# Patient Record
Sex: MALE | Employment: FULL TIME | URBAN - METROPOLITAN AREA
[De-identification: names, ages, dates, MRNs, and addresses within clinical notes are randomized per-mention and may not be internally consistent; named-entity substitution may affect disease eponyms.]

---

## 2017-01-18 ENCOUNTER — COMMUNICATION - HEALTHEAST (OUTPATIENT)
Dept: INTERNAL MEDICINE | Facility: CLINIC | Age: 47
End: 2017-01-18

## 2017-01-20 ENCOUNTER — AMBULATORY - HEALTHEAST (OUTPATIENT)
Dept: INTERNAL MEDICINE | Facility: CLINIC | Age: 47
End: 2017-01-20

## 2017-01-20 DIAGNOSIS — H61.20 IMPACTED CERUMEN: ICD-10-CM

## 2017-01-20 DIAGNOSIS — K21.9 GERD (GASTROESOPHAGEAL REFLUX DISEASE): ICD-10-CM

## 2017-04-10 ENCOUNTER — RECORDS - HEALTHEAST (OUTPATIENT)
Dept: ADMINISTRATIVE | Facility: OTHER | Age: 47
End: 2017-04-10

## 2017-04-11 ENCOUNTER — COMMUNICATION - HEALTHEAST (OUTPATIENT)
Dept: INTERNAL MEDICINE | Facility: CLINIC | Age: 47
End: 2017-04-11

## 2017-04-13 ENCOUNTER — RECORDS - HEALTHEAST (OUTPATIENT)
Dept: ADMINISTRATIVE | Facility: OTHER | Age: 47
End: 2017-04-13

## 2017-04-14 ENCOUNTER — RECORDS - HEALTHEAST (OUTPATIENT)
Dept: ADMINISTRATIVE | Facility: OTHER | Age: 47
End: 2017-04-14

## 2017-04-19 ENCOUNTER — RECORDS - HEALTHEAST (OUTPATIENT)
Dept: GENERAL RADIOLOGY | Facility: CLINIC | Age: 47
End: 2017-04-19

## 2017-04-19 ENCOUNTER — OFFICE VISIT - HEALTHEAST (OUTPATIENT)
Dept: INTERNAL MEDICINE | Facility: CLINIC | Age: 47
End: 2017-04-19

## 2017-04-19 DIAGNOSIS — S69.90XA FINGER INJURY: ICD-10-CM

## 2017-04-19 DIAGNOSIS — S69.90XA UNSPECIFIED INJURY OF UNSPECIFIED WRIST, HAND AND FINGER(S), INITIAL ENCOUNTER: ICD-10-CM

## 2017-04-19 ASSESSMENT — MIFFLIN-ST. JEOR: SCORE: 1484.08

## 2017-05-18 ENCOUNTER — AMBULATORY - HEALTHEAST (OUTPATIENT)
Dept: INTERNAL MEDICINE | Facility: CLINIC | Age: 47
End: 2017-05-18

## 2017-05-18 ENCOUNTER — COMMUNICATION - HEALTHEAST (OUTPATIENT)
Dept: INTERNAL MEDICINE | Facility: CLINIC | Age: 47
End: 2017-05-18

## 2017-05-18 DIAGNOSIS — K21.9 GASTROESOPHAGEAL REFLUX DISEASE WITHOUT ESOPHAGITIS: ICD-10-CM

## 2017-05-19 ENCOUNTER — RECORDS - HEALTHEAST (OUTPATIENT)
Dept: ADMINISTRATIVE | Facility: OTHER | Age: 47
End: 2017-05-19

## 2017-05-31 ENCOUNTER — RECORDS - HEALTHEAST (OUTPATIENT)
Dept: ADMINISTRATIVE | Facility: OTHER | Age: 47
End: 2017-05-31

## 2017-10-11 ENCOUNTER — COMMUNICATION - HEALTHEAST (OUTPATIENT)
Dept: INTERNAL MEDICINE | Facility: CLINIC | Age: 47
End: 2017-10-11

## 2017-10-12 ENCOUNTER — COMMUNICATION - HEALTHEAST (OUTPATIENT)
Dept: INTERNAL MEDICINE | Facility: CLINIC | Age: 47
End: 2017-10-12

## 2017-10-12 DIAGNOSIS — K21.9 GASTROESOPHAGEAL REFLUX DISEASE WITHOUT ESOPHAGITIS: ICD-10-CM

## 2017-10-13 ENCOUNTER — COMMUNICATION - HEALTHEAST (OUTPATIENT)
Dept: INTERNAL MEDICINE | Facility: CLINIC | Age: 47
End: 2017-10-13

## 2017-10-13 DIAGNOSIS — K21.9 GASTROESOPHAGEAL REFLUX DISEASE WITHOUT ESOPHAGITIS: ICD-10-CM

## 2017-10-17 ENCOUNTER — RECORDS - HEALTHEAST (OUTPATIENT)
Dept: ADMINISTRATIVE | Facility: OTHER | Age: 47
End: 2017-10-17

## 2017-10-17 ENCOUNTER — COMMUNICATION - HEALTHEAST (OUTPATIENT)
Dept: INTERNAL MEDICINE | Facility: CLINIC | Age: 47
End: 2017-10-17

## 2017-10-25 ENCOUNTER — COMMUNICATION - HEALTHEAST (OUTPATIENT)
Dept: INTERNAL MEDICINE | Facility: CLINIC | Age: 47
End: 2017-10-25

## 2017-11-15 ENCOUNTER — RECORDS - HEALTHEAST (OUTPATIENT)
Dept: ADMINISTRATIVE | Facility: OTHER | Age: 47
End: 2017-11-15

## 2017-11-17 ENCOUNTER — OFFICE VISIT - HEALTHEAST (OUTPATIENT)
Dept: INTERNAL MEDICINE | Facility: CLINIC | Age: 47
End: 2017-11-17

## 2017-11-17 DIAGNOSIS — K58.9 IRRITABLE BOWEL SYNDROME: ICD-10-CM

## 2017-11-17 DIAGNOSIS — K21.9 GERD (GASTROESOPHAGEAL REFLUX DISEASE): ICD-10-CM

## 2017-11-17 DIAGNOSIS — E55.9 VITAMIN D DEFICIENCY: ICD-10-CM

## 2017-11-17 DIAGNOSIS — D64.9 ANEMIA: ICD-10-CM

## 2017-11-17 DIAGNOSIS — Z00.00 ROUTINE GENERAL MEDICAL EXAMINATION AT A HEALTH CARE FACILITY: ICD-10-CM

## 2017-11-17 LAB
CHOLEST SERPL-MCNC: 166 MG/DL
FASTING STATUS PATIENT QL REPORTED: YES
HDLC SERPL-MCNC: 42 MG/DL
LDLC SERPL CALC-MCNC: 110 MG/DL
TRIGL SERPL-MCNC: 70 MG/DL

## 2017-11-17 ASSESSMENT — MIFFLIN-ST. JEOR: SCORE: 1488.61

## 2018-04-12 ENCOUNTER — COMMUNICATION - HEALTHEAST (OUTPATIENT)
Dept: INTERNAL MEDICINE | Facility: CLINIC | Age: 48
End: 2018-04-12

## 2018-04-12 DIAGNOSIS — K21.9 GASTROESOPHAGEAL REFLUX DISEASE WITHOUT ESOPHAGITIS: ICD-10-CM

## 2018-08-28 ENCOUNTER — AMBULATORY - HEALTHEAST (OUTPATIENT)
Dept: INTERNAL MEDICINE | Facility: CLINIC | Age: 48
End: 2018-08-28

## 2018-08-28 ENCOUNTER — COMMUNICATION - HEALTHEAST (OUTPATIENT)
Dept: INTERNAL MEDICINE | Facility: CLINIC | Age: 48
End: 2018-08-28

## 2018-11-19 ENCOUNTER — OFFICE VISIT - HEALTHEAST (OUTPATIENT)
Dept: INTERNAL MEDICINE | Facility: CLINIC | Age: 48
End: 2018-11-19

## 2018-11-19 DIAGNOSIS — E55.9 VITAMIN D DEFICIENCY: ICD-10-CM

## 2018-11-19 DIAGNOSIS — Z00.00 ROUTINE GENERAL MEDICAL EXAMINATION AT A HEALTH CARE FACILITY: ICD-10-CM

## 2018-11-19 DIAGNOSIS — K21.9 GASTROESOPHAGEAL REFLUX DISEASE WITHOUT ESOPHAGITIS: ICD-10-CM

## 2018-11-19 DIAGNOSIS — K58.9 IRRITABLE BOWEL SYNDROME, UNSPECIFIED TYPE: ICD-10-CM

## 2018-11-19 ASSESSMENT — MIFFLIN-ST. JEOR: SCORE: 1484.08

## 2018-11-21 ENCOUNTER — RECORDS - HEALTHEAST (OUTPATIENT)
Dept: ADMINISTRATIVE | Facility: OTHER | Age: 48
End: 2018-11-21

## 2018-11-21 ENCOUNTER — COMMUNICATION - HEALTHEAST (OUTPATIENT)
Dept: INTERNAL MEDICINE | Facility: CLINIC | Age: 48
End: 2018-11-21

## 2019-07-30 ENCOUNTER — COMMUNICATION - HEALTHEAST (OUTPATIENT)
Dept: INTERNAL MEDICINE | Facility: CLINIC | Age: 49
End: 2019-07-30

## 2019-07-30 DIAGNOSIS — K21.9 GASTROESOPHAGEAL REFLUX DISEASE WITHOUT ESOPHAGITIS: ICD-10-CM

## 2019-07-31 ENCOUNTER — COMMUNICATION - HEALTHEAST (OUTPATIENT)
Dept: INTERNAL MEDICINE | Facility: CLINIC | Age: 49
End: 2019-07-31

## 2019-07-31 DIAGNOSIS — K21.9 GASTROESOPHAGEAL REFLUX DISEASE WITHOUT ESOPHAGITIS: ICD-10-CM

## 2019-08-16 ENCOUNTER — AMBULATORY - HEALTHEAST (OUTPATIENT)
Dept: INTERNAL MEDICINE | Facility: CLINIC | Age: 49
End: 2019-08-16

## 2019-08-16 ENCOUNTER — COMMUNICATION - HEALTHEAST (OUTPATIENT)
Dept: INTERNAL MEDICINE | Facility: CLINIC | Age: 49
End: 2019-08-16

## 2019-08-16 ENCOUNTER — OFFICE VISIT - HEALTHEAST (OUTPATIENT)
Dept: INTERNAL MEDICINE | Facility: CLINIC | Age: 49
End: 2019-08-16

## 2019-08-16 DIAGNOSIS — K64.9 HEMORRHOIDS, UNSPECIFIED HEMORRHOID TYPE: ICD-10-CM

## 2019-08-16 DIAGNOSIS — K58.9 IRRITABLE BOWEL SYNDROME, UNSPECIFIED TYPE: ICD-10-CM

## 2019-08-16 DIAGNOSIS — L29.0 ANAL PRURITUS: ICD-10-CM

## 2019-08-16 ASSESSMENT — MIFFLIN-ST. JEOR: SCORE: 1465.93

## 2019-08-21 ENCOUNTER — COMMUNICATION - HEALTHEAST (OUTPATIENT)
Dept: INTERNAL MEDICINE | Facility: CLINIC | Age: 49
End: 2019-08-21

## 2019-11-08 ENCOUNTER — HEALTH MAINTENANCE LETTER (OUTPATIENT)
Age: 49
End: 2019-11-08

## 2019-11-22 ENCOUNTER — OFFICE VISIT - HEALTHEAST (OUTPATIENT)
Dept: INTERNAL MEDICINE | Facility: CLINIC | Age: 49
End: 2019-11-22

## 2019-11-22 DIAGNOSIS — K58.9 IRRITABLE BOWEL SYNDROME, UNSPECIFIED TYPE: ICD-10-CM

## 2019-11-22 DIAGNOSIS — Z13.220 LIPID SCREENING: ICD-10-CM

## 2019-11-22 DIAGNOSIS — E55.9 VITAMIN D DEFICIENCY: ICD-10-CM

## 2019-11-22 DIAGNOSIS — Z00.00 ROUTINE GENERAL MEDICAL EXAMINATION AT A HEALTH CARE FACILITY: ICD-10-CM

## 2019-11-22 DIAGNOSIS — Z11.4 SCREENING FOR HIV (HUMAN IMMUNODEFICIENCY VIRUS): ICD-10-CM

## 2019-11-22 DIAGNOSIS — K21.9 GASTROESOPHAGEAL REFLUX DISEASE WITHOUT ESOPHAGITIS: ICD-10-CM

## 2019-11-22 DIAGNOSIS — L30.9 PERIANAL DERMATITIS: ICD-10-CM

## 2019-11-22 LAB
ALBUMIN SERPL-MCNC: 4.1 G/DL (ref 3.5–5)
ALBUMIN UR-MCNC: NEGATIVE MG/DL
ALP SERPL-CCNC: 83 U/L (ref 45–120)
ALT SERPL W P-5'-P-CCNC: 13 U/L (ref 0–45)
ANION GAP SERPL CALCULATED.3IONS-SCNC: 11 MMOL/L (ref 5–18)
APPEARANCE UR: CLEAR
AST SERPL W P-5'-P-CCNC: 13 U/L (ref 0–40)
BILIRUB SERPL-MCNC: 1 MG/DL (ref 0–1)
BILIRUB UR QL STRIP: NEGATIVE
BUN SERPL-MCNC: 19 MG/DL (ref 8–22)
CALCIUM SERPL-MCNC: 9.7 MG/DL (ref 8.5–10.5)
CHLORIDE BLD-SCNC: 102 MMOL/L (ref 98–107)
CHOLEST SERPL-MCNC: 170 MG/DL
CO2 SERPL-SCNC: 27 MMOL/L (ref 22–31)
COLOR UR AUTO: YELLOW
CREAT SERPL-MCNC: 1.04 MG/DL (ref 0.7–1.3)
ERYTHROCYTE [DISTWIDTH] IN BLOOD BY AUTOMATED COUNT: 12.5 % (ref 11–14.5)
FASTING STATUS PATIENT QL REPORTED: YES
GFR SERPL CREATININE-BSD FRML MDRD: >60 ML/MIN/1.73M2
GLUCOSE BLD-MCNC: 98 MG/DL (ref 70–125)
GLUCOSE UR STRIP-MCNC: NEGATIVE MG/DL
HCT VFR BLD AUTO: 41.6 % (ref 40–54)
HDLC SERPL-MCNC: 49 MG/DL
HGB BLD-MCNC: 13.9 G/DL (ref 14–18)
HGB UR QL STRIP: NEGATIVE
HIV 1+2 AB+HIV1 P24 AG SERPL QL IA: NEGATIVE
KETONES UR STRIP-MCNC: NEGATIVE MG/DL
LDLC SERPL CALC-MCNC: 108 MG/DL
LEUKOCYTE ESTERASE UR QL STRIP: NEGATIVE
MCH RBC QN AUTO: 29.9 PG (ref 27–34)
MCHC RBC AUTO-ENTMCNC: 33.3 G/DL (ref 32–36)
MCV RBC AUTO: 90 FL (ref 80–100)
NITRATE UR QL: NEGATIVE
PH UR STRIP: 6 [PH] (ref 5–8)
PLATELET # BLD AUTO: 188 THOU/UL (ref 140–440)
PMV BLD AUTO: 8.6 FL (ref 7–10)
POTASSIUM BLD-SCNC: 4.3 MMOL/L (ref 3.5–5)
PROT SERPL-MCNC: 7.5 G/DL (ref 6–8)
RBC # BLD AUTO: 4.65 MILL/UL (ref 4.4–6.2)
SODIUM SERPL-SCNC: 140 MMOL/L (ref 136–145)
SP GR UR STRIP: 1.01 (ref 1–1.03)
TRIGL SERPL-MCNC: 63 MG/DL
UROBILINOGEN UR STRIP-ACNC: NORMAL
WBC: 7.4 THOU/UL (ref 4–11)

## 2019-11-22 ASSESSMENT — MIFFLIN-ST. JEOR: SCORE: 1493.15

## 2019-11-23 ENCOUNTER — COMMUNICATION - HEALTHEAST (OUTPATIENT)
Dept: INTERNAL MEDICINE | Facility: CLINIC | Age: 49
End: 2019-11-23

## 2019-11-25 LAB
25(OH)D3 SERPL-MCNC: 26.7 NG/ML (ref 30–80)
25(OH)D3 SERPL-MCNC: 26.7 NG/ML (ref 30–80)

## 2020-07-07 ENCOUNTER — RECORDS - HEALTHEAST (OUTPATIENT)
Dept: ADMINISTRATIVE | Facility: OTHER | Age: 50
End: 2020-07-07
Payer: COMMERCIAL

## 2020-10-22 ENCOUNTER — COMMUNICATION - HEALTHEAST (OUTPATIENT)
Dept: INTERNAL MEDICINE | Facility: CLINIC | Age: 50
End: 2020-10-22

## 2020-11-13 ENCOUNTER — OFFICE VISIT - HEALTHEAST (OUTPATIENT)
Dept: INTERNAL MEDICINE | Facility: CLINIC | Age: 50
End: 2020-11-13

## 2020-11-13 DIAGNOSIS — Z00.00 ROUTINE GENERAL MEDICAL EXAMINATION AT A HEALTH CARE FACILITY: ICD-10-CM

## 2020-11-13 DIAGNOSIS — Z11.59 ENCOUNTER FOR HEPATITIS C SCREENING TEST FOR LOW RISK PATIENT: ICD-10-CM

## 2020-11-13 DIAGNOSIS — K21.9 GASTROESOPHAGEAL REFLUX DISEASE WITHOUT ESOPHAGITIS: ICD-10-CM

## 2020-11-13 DIAGNOSIS — E55.9 VITAMIN D DEFICIENCY: ICD-10-CM

## 2020-11-13 DIAGNOSIS — K59.89 VISCERAL HYPERSENSITIVITY SYNDROME: ICD-10-CM

## 2020-11-13 DIAGNOSIS — Z13.220 ENCOUNTER FOR SCREENING FOR LIPOID DISORDERS: ICD-10-CM

## 2020-11-13 LAB
ALBUMIN SERPL-MCNC: 4.4 G/DL (ref 3.5–5)
ALBUMIN UR-MCNC: NEGATIVE MG/DL
ALP SERPL-CCNC: 72 U/L (ref 45–120)
ALT SERPL W P-5'-P-CCNC: 15 U/L (ref 0–45)
ANION GAP SERPL CALCULATED.3IONS-SCNC: 8 MMOL/L (ref 5–18)
APPEARANCE UR: CLEAR
AST SERPL W P-5'-P-CCNC: 15 U/L (ref 0–40)
BILIRUB SERPL-MCNC: 1.6 MG/DL (ref 0–1)
BILIRUB UR QL STRIP: NEGATIVE
BUN SERPL-MCNC: 18 MG/DL (ref 8–22)
CALCIUM SERPL-MCNC: 9.3 MG/DL (ref 8.5–10.5)
CHLORIDE BLD-SCNC: 99 MMOL/L (ref 98–107)
CHOLEST SERPL-MCNC: 189 MG/DL
CO2 SERPL-SCNC: 31 MMOL/L (ref 22–31)
COLOR UR AUTO: YELLOW
CREAT SERPL-MCNC: 1 MG/DL (ref 0.7–1.3)
ERYTHROCYTE [DISTWIDTH] IN BLOOD BY AUTOMATED COUNT: 11.8 % (ref 11–14.5)
FASTING STATUS PATIENT QL REPORTED: YES
GFR SERPL CREATININE-BSD FRML MDRD: >60 ML/MIN/1.73M2
GLUCOSE BLD-MCNC: 88 MG/DL (ref 70–125)
GLUCOSE UR STRIP-MCNC: NEGATIVE MG/DL
HCT VFR BLD AUTO: 46.9 % (ref 40–54)
HDLC SERPL-MCNC: 56 MG/DL
HGB BLD-MCNC: 15.6 G/DL (ref 14–18)
HGB UR QL STRIP: NEGATIVE
KETONES UR STRIP-MCNC: NEGATIVE MG/DL
LDLC SERPL CALC-MCNC: 115 MG/DL
LEUKOCYTE ESTERASE UR QL STRIP: NEGATIVE
MCH RBC QN AUTO: 31.3 PG (ref 27–34)
MCHC RBC AUTO-ENTMCNC: 33.2 G/DL (ref 32–36)
MCV RBC AUTO: 94 FL (ref 80–100)
NITRATE UR QL: NEGATIVE
PH UR STRIP: 5.5 [PH] (ref 5–8)
PLATELET # BLD AUTO: 175 THOU/UL (ref 140–440)
PMV BLD AUTO: 8.5 FL (ref 7–10)
POTASSIUM BLD-SCNC: 4 MMOL/L (ref 3.5–5)
PROT SERPL-MCNC: 7.5 G/DL (ref 6–8)
RBC # BLD AUTO: 4.97 MILL/UL (ref 4.4–6.2)
SODIUM SERPL-SCNC: 138 MMOL/L (ref 136–145)
SP GR UR STRIP: 1.01 (ref 1–1.03)
TRIGL SERPL-MCNC: 88 MG/DL
UROBILINOGEN UR STRIP-ACNC: NORMAL
WBC: 5.4 THOU/UL (ref 4–11)

## 2020-11-13 ASSESSMENT — MIFFLIN-ST. JEOR: SCORE: 1479.54

## 2020-11-16 LAB
25(OH)D3 SERPL-MCNC: 39.4 NG/ML (ref 30–80)
25(OH)D3 SERPL-MCNC: 39.4 NG/ML (ref 30–80)
HCV AB SERPL QL IA: NEGATIVE

## 2020-12-06 ENCOUNTER — HEALTH MAINTENANCE LETTER (OUTPATIENT)
Age: 50
End: 2020-12-06

## 2021-05-29 ENCOUNTER — RECORDS - HEALTHEAST (OUTPATIENT)
Dept: ADMINISTRATIVE | Facility: CLINIC | Age: 51
End: 2021-05-29

## 2021-05-30 ENCOUNTER — RECORDS - HEALTHEAST (OUTPATIENT)
Dept: ADMINISTRATIVE | Facility: CLINIC | Age: 51
End: 2021-05-30

## 2021-05-30 VITALS — HEIGHT: 64 IN | WEIGHT: 155 LBS | BODY MASS INDEX: 26.46 KG/M2

## 2021-05-30 NOTE — TELEPHONE ENCOUNTER
Refill Approved    Rx renewed per Medication Renewal Policy. Medication was last renewed on Nov 2018..    Milena Ramirez, [facility] Triage/Med Refill 7/30/2019     Requested Prescriptions   Pending Prescriptions Disp Refills     esomeprazole (NEXIUM) 20 MG capsule 90 capsule 0       GI Medications Refill Protocol Passed - 7/30/2019 12:53 PM        Passed - PCP or prescribing provider visit in last 12 or next 3 months.     Last office visit with prescriber/PCP: Visit date not found OR same dept: Visit date not found OR same specialty: 4/19/2017 Brian Guzman MD  Last physical: 11/19/2018 Last MTM visit: Visit date not found   Next visit within 3 mo: Visit date not found  Next physical within 3 mo: Visit date not found  Prescriber OR PCP: Hector Yoder MD  Last diagnosis associated with med order: 1. Gastroesophageal reflux disease without esophagitis  - esomeprazole (NEXIUM) 20 MG capsule  Dispense: 90 capsule; Refill: 0    If protocol passes may refill for 12 months if within 3 months of last provider visit (or a total of 15 months).

## 2021-05-31 VITALS — WEIGHT: 156 LBS | BODY MASS INDEX: 26.63 KG/M2 | HEIGHT: 64 IN

## 2021-05-31 NOTE — TELEPHONE ENCOUNTER
"Disregard previous message.  I just saw the reason he wants 20 mg in the \"patient call\" bucket  "

## 2021-05-31 NOTE — TELEPHONE ENCOUNTER
Question following Office Visit  When did you see your provider: Today 8/16/19  What is your question: During visit discussed a prescription for the Anusol HC suppository .  Needs this prescription called into pharmacy, as soon as possible as it leaving town again as soon as medication is picked up.  Okay to leave a detailed message: Yes    Call to [facility], on [address] in [facility]

## 2021-05-31 NOTE — TELEPHONE ENCOUNTER
Fax received from [facility], they have started the Prior Authorization Process via Cover My Meds    CoverMyMeds Key: [id number]    Medication Name:   hydrocortisone 25 mg suppository 24 suppository 1 8/16/2019     Sig - Route: Insert 1 suppository (25 mg total) into the rectum 2 (two) times a day as needed for hemorrhoids. - Rectal    Sent to pharmacy as: hydrocortisone 25 mg suppository    E-Prescribing Status: Receipt confirmed by pharmacy (8/16/2019 12:12 PM CDT)          Insurance Plan: [employer]  PBM: [employer]  Patient ID: not provided on fax    Please complete the PA process

## 2021-05-31 NOTE — TELEPHONE ENCOUNTER
Left message to call back for: Leonela  Information to relay to patient:  Rx has been sent to pharmacy

## 2021-05-31 NOTE — TELEPHONE ENCOUNTER
I assume he is having problems with hemorrhoids?  I actually do not recall the conversation.  I did send the prescription for Anusol HC suppository to his pharmacy.  Please notify

## 2021-05-31 NOTE — PROGRESS NOTES
Office Visit - Follow Up   Leonela Watson   48 y.o. male    Date of Visit: 8/16/2019    Chief Complaint   Patient presents with     Rectal Pain     painful bowel movements, itching and spotting x 1.5 weeks        Assessment and Plan     Perianal skin eruption and pain with defecation.   Symptom duration 2 weeks, progressive, and has not responded to his attempts at local skin care.  I suspect this is intertrigo, and we will treat with a skin care regimen as follows  - Wash area with a mild soap  - Pat dry with clean towel  - Dry further with hair dryer set on no-heat (air only)  - Apply OTC Desenex 2% antifungal powder  - Do this twice a day (morning and evening)    Even though I did not observe enlarged hemorrhoids on today's exam, he could still be experiencing hemorrhoidal pain.  He can combine the above measures with a trial of Anusol-HC suppository (contains hydrocortisone). Use per package direction (use once a day).    Hard stools, in the context of irritable bowel syndrome  I also asked him to start docusate stool softener (available OTC), which will help reduce pain and irritation       History of Present Illness   This 48 y.o. old [facility] internal medicine patient comes for evaluation of perianal skin eruption and pain with defecation.    He has a history of irritable bowel syndrome and reports hard stools  Pain with defecation has been going on for about 2 weeks.  During the same timeframe, he is developed raw skin and irritation of the perianal skin.  The skin is raw, moist, and he reports a fetid odor.  He has seen a spot of blood on the toilet paper, but no large amounts    He has GERD, on Nexium.         Medications, Allergies, Social, and Problem List   Current Outpatient Medications   Medication Sig Dispense Refill     cholecalciferol, vitamin D3, 1,000 unit tablet Take 1,000 Units by mouth daily.       esomeprazole (NEXIUM) 20 MG capsule Take 1 capsule (20 mg total) by mouth daily  "before breakfast. 90 capsule 3     ranitidine (ZANTAC) 150 MG tablet        esomeprazole (NEXIUM) 40 MG capsule Take 1 capsule (40 mg total) by mouth daily before breakfast. 90 capsule 1     imipramine (TOFRANIL) 10 MG tablet Take 1 tablet (10 mg total) by mouth at bedtime. 30 tablet 11     multivitamin therapeutic (THERAGRAN) tablet Take 1 tablet by mouth daily.       No current facility-administered medications for this visit.      Allergies   Allergen Reactions     Amitriptyline      Weight gain     Nortriptyline      Fatigue     Penicillins Hives     Social History     Tobacco Use     Smoking status: Former Smoker     Smokeless tobacco: Former User     Quit date: 1/30/2004     Tobacco comment: quit 2004   Substance Use Topics     Alcohol use: No     Drug use: Not on file     Patient Active Problem List   Diagnosis     GERD (gastroesophageal reflux disease)     Kidney stone     Irritable bowel syndrome     Globus sensation     Genital herpes     Impacted cerumen     Anemia     Vitamin D deficiency        Reviewed, reconciled and updated       Physical Exam   General Appearance: Very pleasant, but a bit anxious    /76 (Patient Site: Right Arm, Patient Position: Sitting)   Pulse 73   Ht 5' 4\" (1.626 m)   Wt 151 lb (68.5 kg)   SpO2 98%   BMI 25.92 kg/m      Abdomen nontender  Perianal skin is notable for moist, raw, erythematous area extending about 6 cm  radius around the anal verge.  Fetid odor  Palpation of the anal canal suggests small internal hemorrhoids.  No blood seen on the glove after digital rectal exam     Additional Information        Michael Boucher MD    "

## 2021-05-31 NOTE — PATIENT INSTRUCTIONS - HE
Perianal skin eruption and pain with defecation.   Symptom duration 2 weeks, progressive, and has not responded to his attempts at local skin care.  I suspect this is intertrigo, and we will treat with a skin care regimen as follows  - Wash area with a mild soap  - Pat dry with clean towel  - Dry further with hair dryer set on no-heat (air only)  - Apply OTC Desenex 2% antifungal powder  - Do this twice a day (morning and evening)    Even though I did not observe enlarged hemorrhoids on today's exam, he could still be experiencing hemorrhoidal pain.  He can combine the above measures with a trial of Anusol-HC suppository (contains hydrocortisone). Use per package direction (use once a day).    Hard stools  I also asked him to start docusate stool softener (available OTC), which will help reduce pain and irritation

## 2021-05-31 NOTE — TELEPHONE ENCOUNTER
Peri Burton for refill requested below?  Refill has been set up for you to review.  Alondra MCNEAL, JOSR/CMT....................5:02 PM

## 2021-06-02 VITALS — BODY MASS INDEX: 26.46 KG/M2 | HEIGHT: 64 IN | WEIGHT: 155 LBS

## 2021-06-03 VITALS
BODY MASS INDEX: 26.8 KG/M2 | SYSTOLIC BLOOD PRESSURE: 112 MMHG | HEART RATE: 78 BPM | WEIGHT: 157 LBS | HEIGHT: 64 IN | OXYGEN SATURATION: 98 % | DIASTOLIC BLOOD PRESSURE: 70 MMHG

## 2021-06-03 VITALS — BODY MASS INDEX: 25.78 KG/M2 | WEIGHT: 151 LBS | HEIGHT: 64 IN

## 2021-06-04 VITALS
BODY MASS INDEX: 26.29 KG/M2 | WEIGHT: 154 LBS | OXYGEN SATURATION: 99 % | HEART RATE: 65 BPM | DIASTOLIC BLOOD PRESSURE: 72 MMHG | HEIGHT: 64 IN | SYSTOLIC BLOOD PRESSURE: 110 MMHG

## 2021-06-10 NOTE — PROGRESS NOTES
"OFFICE VISIT NOTE    Subjective:   Chief Complaint:  Hand Pain (slammed left pinkie finger into the door frame catching himself from falling last night. hurts really bad, able to bend it.)    86-year-old man who jammed his left finger-hand into a door jam last night when he slipped.  He has discomfort and points to the left fifth finger especially the DIP joint region.    Current Outpatient Prescriptions   Medication Sig     cholecalciferol, vitamin D3, 1,000 unit tablet Take 1,000 Units by mouth daily.     esomeprazole (NEXIUM) 40 MG capsule Take 1 capsule (40 mg total) by mouth every morning before breakfast.     multivitamin therapeutic (THERAGRAN) tablet Take 1 tablet by mouth daily.       Review of Systems:  A comprehensive review of systems is negative except for the comments above    Objective:    Pulse 67  Ht 5' 4\" (1.626 m)  Wt 155 lb (70.3 kg)  SpO2 98%  BMI 26.61 kg/m2  GENERAL: No acute distress.  There is minor swelling over the left fifth finger DIP joint.  Good flexion extension.  Circulation sensation all seem intact.  No obvious deformity.    Assessment & Plan   Leonela Watson is a 46 y.o. male.    Left hand-finger contusion.  X-rays done but does not show any evidence of fracture.  Will treat this with cold packs as needed use of ibuprofen.    Diagnoses and all orders for this visit:    Finger injury  -     XR Hand Left 3 or More VWS; Future; Expected date: 4/19/17        Brian Guzman MD  Transcription using voice recognition software, may contain typographical errors.    "

## 2021-06-14 NOTE — PROGRESS NOTES
Office Visit - Physical   Leonela Watson   47 y.o.  male    Date of visit: 11/17/2017  Physician: Hector Yoder MD     Assessment and Plan   1. Routine general medical examination at a health care facility  Immunizations are reviewed and everything is up-to-date.  Living will discussed.  Non-smoker.  Does not use alcohol.  He tries to walk or ride his bike for exercise.  Colonoscopy at age 52.  Prostate is exam completed.  Check PSA at age 52.  Eye exams every 2 years and seeing his dentist regularly.  Skin exam performed and recommending regular use of sunblock.   Will screen for diabetes with fasting glucose.  Checking fasting lipid profile.      - Urinalysis  - Comprehensive Metabolic Panel  - Lipid Cascade    2. GERD (gastroesophageal reflux disease)  Remains on Nexium 40 mg daily although occasionally will take 20 mg instead.  Has been unable to wean off.  He had EGD in April.  Still has intermittent chest pains.  Thought to be IBS as described below.    3. Vitamin D deficiency  Vitamin D was low last year.  He is taking 1000 units daily.  Recheck level.  Would increase to 2000 units if still below 30  - Vitamin D, Total (25-Hydroxy)    4. Irritable bowel syndrome  There is a functional component to his recurrent chest pain related to esophagus.  Tricyclic antidepressants have been tried in the past and were partially helpful but he could not tolerate because of side effects.  We discussed imipramine and will begin a low dose of 10 mg at bedtime with plans to titrate up to 25 or 50 mg depending on how well he tolerates.  Prescription sent to his pharmacy    5. Anemia  Is a history of anemia although hemoglobin was improved last year.  Iron studies looked normal.  Will recheck hemoglobin.  We will also check B12 level especially with long-term PPI use  - Hemoglobin  - Vitamin B12    Return in about 1 year (around 11/17/2018) for Annual physical.     Chief Complaint   Chief Complaint   Patient presents  with     Annual Exam        Patient Profile   Social History     Social History Narrative    [employer]    , 2 children                Past Medical History   Patient Active Problem List   Diagnosis     GERD (gastroesophageal reflux disease)     Kidney stone     Irritable bowel syndrome     Globus sensation     Genital herpes     Impacted cerumen     Anemia     Vitamin D deficiency       Past Surgical History  He has no past surgical history on file.     History of Present Illness   This 47 y.o. old gentleman is here for his annual physical and follow-up medical problems.  Ongoing problems with reflux.  Using Nexium 40 mg daily.  Will occasionally take only 20 mg but this is occurring less often.  He underwent EGD this April showing no esophagitis or other abnormalities.  Some polyps in his stomach.  Biopsies were negative.  Continues to watch his diet carefully.  He does not drink alcohol.  He quit smoking 14 years ago.  When he tries to decrease the Nexium, symptoms worsen.  He describes pains in his chest.  Previously had globus sensation although this is under good control.  He has been evaluated by gastroenterology.  It is felt that he has a functional bowel problem affecting his esophagus.  We have tried amitriptyline and nortriptyline.  These cause side effects including fatigue or weight gain.  They were partially helpful.      Review of Systems: A comprehensive review of systems was negative except as noted.  General: No chronic fatigue, unexpected weight loss or weight gain, fevers, chills, or night sweats  Eyes: No significant change in vision.  Seeing ophthalmologist regularly.  ENT: No ear or sinus infections.  No change in hearing.  No tinnitus.  Respiratory: No wheezing, dyspnea on exertion, or chronic cough  Cardiovascular: No chest pain, palpitations, dizziness, or syncope.  No peripheral edema.  GI: No abdominal pain, nausea, vomiting, constipation, or diarrhea  : No change in  "frequency or incontinence.  No hematuria.  Skin: No new rashes or lesions.  He sees dermatology  Neurologic: No headaches, seizures, dizziness, weakness, or numbness.  Musculoskeletal: No muscle or joint pain.  Lymphatic: No swollen lymph nodes  Psychiatric: No depression, anxiety, or sleep disorder.       Medications and Allergies   Current Outpatient Prescriptions   Medication Sig Dispense Refill     cholecalciferol, vitamin D3, 1,000 unit tablet Take 1,000 Units by mouth daily.       esomeprazole (NEXIUM) 20 MG capsule Take 1 capsule (20 mg) by mouth daily. 90 capsule 0     esomeprazole (NEXIUM) 40 MG capsule Take 1 capsule (40 mg total) by mouth every morning before breakfast. 90 capsule 3     multivitamin therapeutic (THERAGRAN) tablet Take 1 tablet by mouth daily.       imipramine (TOFRANIL) 10 MG tablet Take 1 tablet (10 mg total) by mouth at bedtime. 30 tablet 11     No current facility-administered medications for this visit.      Allergies   Allergen Reactions     Amitriptyline      Weight gain     Nortriptyline      Fatigue     Penicillins Hives        Family and Social History   No family history on file.     Social History   Substance Use Topics     Smoking status: Former Smoker     Smokeless tobacco: Former User     Quit date: 1/30/2004      Comment: quit 2004     Alcohol use No        Physical Exam   General Appearance:   Well-appearing middle-aged male    /70 (Patient Site: Left Arm, Patient Position: Sitting, Cuff Size: Adult Regular)  Pulse 64  Ht 5' 4\" (1.626 m)  Wt 156 lb (70.8 kg)  SpO2 99%  BMI 26.78 kg/m2    EYES: Eyelids, conjunctiva, and sclera were normal. Pupils were normal. Cornea, iris, and lens were normal bilaterally.  HEAD, EARS, NOSE, MOUTH, AND THROAT: Head and face were normal. Nose appearance was normal and there was no discharge. Oropharynx was normal.  NECK: Neck appearance was normal. There were no neck masses and the thyroid was not enlarged and no nodules are felt. "  No lymphadenopathy.  RESPIRATORY: Breathing pattern was normal and the chest moved symmetrically.  Percussion/auscultatory percussion was normal.  Lung sounds were normal and there were no rales or wheezes.  CARDIOVASCULAR: Heart rate and rhythm were normal.  S1 and S2 were normal and there were no extra sounds or murmurs. Peripheral pulses in arms and legs were normal.  Jugular venous pressure was normal.  There was no peripheral edema.  No carotid bruits.  GASTROINTESTINAL: The abdomen was normal in contour.  Bowel sounds were present.  Percussion detected no organ enlargement or tenderness.  Palpation detected no tenderness, mass, or enlarged organs.   GENITALIA: No penile or testicular lesions.  No inguinal hernia.  RECTAL/PROSTATE: No external lesions.  Sphincter tone normal.  No palpable rectal lesions.  Prostate normal size, smooth, nontender without palpable lesions.  MUSCULOSKELETAL: Skeletal configuration was normal and muscle mass was normal for age. Joint appearance was overall normal.  LYMPHATIC: There were no enlarged nodes.  SKIN/HAIR/NAILS: Skin color was normal.  There were no skin lesions.  Hair and nails were normal.  NEUROLOGIC: The patient was alert and oriented to person, place, time, and circumstance. Speech was normal. Cranial nerves were normal. Motor strength was normal for age. The patient was normally coordinated.  Sensation intact.  PSYCHIATRIC:  Mood and affect were normal and the patient had normal recent and remote memory. The patient's judgment and insight were normal.       Additional Information        Hector Yoder MD  Internal Medicine  Contact me at [phone]

## 2021-06-16 PROBLEM — K59.89 VISCERAL HYPERSENSITIVITY SYNDROME: Status: ACTIVE | Noted: 2020-11-13

## 2021-06-16 PROBLEM — L30.9 PERIANAL DERMATITIS: Status: ACTIVE | Noted: 2019-11-22

## 2021-06-26 NOTE — PROGRESS NOTES
Progress Notes by Hector Yoder MD at 11/19/2018  8:20 AM     Author: Hector Yoder MD Service: -- Author Type: Physician    Filed: 11/19/2018  9:03 AM Encounter Date: 11/19/2018 Status: Signed    : Hector Yoder MD (Physician)       MALE PREVENTATIVE EXAM    Assessment and Plan:       1. Routine general medical examination at a health care facility  Immunizations are reviewed and recommending getting his tetanus updated.  He will do this at [facility].  Living will has been discussed.  Non-smoker.  He does not use alcohol.  Regular exercise discussed.  Recommending colonoscopy with new screening guidelines.  Prostate exam is normal and I would begin checking PSA at age 52.   He sees his ophthalmologist regularly and gets glaucoma screening.  Skin exam performed and recommending regular use of sunblock.    Will screen for diabetes with fasting glucose.  Lipid profile excellent last year.      2. Gastroesophageal reflux disease without esophagitis  He tried tapering down to ranitidine but symptoms have returned and have been quite bothersome.  Back on Nexium 40 mg daily and I would recommend continuing long-term given severity of symptoms  - esomeprazole (NEXIUM) 40 MG capsule; Take 1 capsule (40 mg total) by mouth daily before breakfast.  Dispense: 90 capsule; Refill: 3    3. Irritable bowel syndrome, unspecified type  There is also a component of functional bowel contributing to his symptoms.  He could try imipramine again although it did cause him some headache and fatigue last year after 1 dose.  Did not tolerate other tricyclics.  Symptoms mostly under control with PPI    4. Vitamin D deficiency  Now taking daily vitamin D supplement        Next follow up:  Return in 1 year (on 11/19/2019) for Annual physical.    Immunization Review  Adult Imm Review: Recommending tetanus      I discussed the following with the patient:   Adult Healthy Living: Importance of  "regular exercise  Healthy nutrition        Subjective:   Chief Complaint: Leonela Watson is an 48 y.o. male here for a preventative health visit.     HPI: He attempted to wean off of Nexium and was on ranitidine but developed increasing chest pain this fall.  Now back on Nexium 40 mg daily and symptoms are not completely under control yet.  Diagnosed with pityriasis rosea this spring.    Healthy Habits  Are you taking a daily aspirin? No  Do you typically exercising at least 40 min, 3-4 times per week?  Yes  Do you usually eat at least 4 servings of fruit and vegetables a day, include whole grains and fiber and avoid regularly eating high fat foods? Yes  Have you had an eye exam in the past two years? Yes  Do you see a dentist twice per year? Yes  Do you have any concerns regarding sleep? No    Safety Screen  If you own firearms, are they secured in a locked gun cabinet or with trigger locks? The patient declines to answer  Do you feel you are safe where you are living?: Yes (11/19/2018  8:10 AM)  Do you feel you are safe in your relationship(s)?: Yes (11/19/2018  8:10 AM)      Review of Systems:  Please see above.  The rest of the review of systems are negative for all systems.     Cancer Screening     Patient has no health maintenance due at this time              History     Reviewed By Date/Time Sections Reviewed    Hector Yoder MD 11/19/2018  8:43 AM Medical, Surgical, Tobacco, Alcohol, Drug Use, Sexual Activity    Hector Yoder MD 11/19/2018  8:40 AM Family    Enriqueta Reyna CMA 11/19/2018  8:09 AM Tobacco, Alcohol, Drug Use, Sexual Activity            Objective:   Vital Signs:   Visit Vitals  /60 (Patient Site: Left Arm, Patient Position: Sitting, Cuff Size: Adult Regular)   Pulse 65   Ht 5' 4\" (1.626 m)   Wt 155 lb (70.3 kg)   SpO2 99%   BMI 26.61 kg/m           PHYSICAL EXAM  EYES: Eyelids, conjunctiva, and sclera were normal. Pupils were normal. Cornea, iris, and lens were normal " bilaterally.  HEAD, EARS, NOSE, MOUTH, AND THROAT: Head and face were normal. Nose appearance was normal and there was no discharge. Oropharynx was normal.  NECK: Neck appearance was normal. There were no neck masses and the thyroid was not enlarged and no nodules are felt.  No lymphadenopathy.  RESPIRATORY: Breathing pattern was normal and the chest moved symmetrically.  Percussion/auscultatory percussion was normal.  Lung sounds were normal and there were no rales or wheezes.  CARDIOVASCULAR: Heart rate and rhythm were normal.  S1 and S2 were normal and there were no extra sounds or murmurs. Peripheral pulses in arms and legs were normal.  Jugular venous pressure was normal.  There was no peripheral edema.  No carotid bruits.  GASTROINTESTINAL: The abdomen was normal in contour.  Bowel sounds were present.   Palpation detected no tenderness, mass, or enlarged organs.   RECTAL/PROSTATE: No external lesions.  Sphincter tone normal.  No palpable rectal lesions.  Prostate normal size, smooth, nontender without nodules  MUSCULOSKELETAL: Skeletal configuration was normal and muscle mass was normal for age. Joint appearance was overall normal.  LYMPHATIC: There were no enlarged nodes.  SKIN/HAIR/NAILS: Skin color was normal.  Hair and nails were normal.There were no skin lesions.  NEUROLOGIC: The patient was alert and oriented to person, place, time, and circumstance. Speech was normal. Cranial nerves were normal. Motor strength was normal for age. The patient was normally coordinated.  Sensation intact.  PSYCHIATRIC:  Mood and affect were normal and the patient had normal recent and remote memory. The patient's judgment and insight were normal.             Medication List           Accurate as of 11/19/18  9:02 AM. If you have any questions, ask your nurse or doctor.               CONTINUE taking these medications    cholecalciferol (vitamin D3) 1,000 unit tablet  INSTRUCTIONS:  Take 1,000 Units by mouth daily.        *  esomeprazole 20 MG capsule  Also known as:  NexIUM  INSTRUCTIONS:  Take 1 capsule (20 mg) by mouth daily.        * esomeprazole 40 MG capsule  Also known as:  NexIUM  INSTRUCTIONS:  Take 1 capsule (40 mg total) by mouth daily before breakfast.        imipramine 10 MG tablet  Also known as:  TOFRANIL  INSTRUCTIONS:  Take 1 tablet (10 mg total) by mouth at bedtime.        multivitamin therapeutic tablet  Also known as:  THERAGRAN  INSTRUCTIONS:  Take 1 tablet by mouth daily.            * This list has 2 medication(s) that are the same as other medications prescribed for you. Read the directions carefully, and ask your doctor or other care provider to review them with you.            STOP taking these medications    ranitidine 150 MG tablet  Also known as:  ZANTAC  Stopped by:  Hector Yoder MD           Where to Get Your Medications      These medications were sent to [facility] - [address] [address]    Phone:  [phone]     esomeprazole 40 MG capsule         Additional Screenings Completed Today:

## 2021-06-28 NOTE — PROGRESS NOTES
Progress Notes by Hector Yoder MD at 11/22/2019  2:20 PM     Author: Hector Yoder MD Service: -- Author Type: Physician    Filed: 11/22/2019  5:18 PM Encounter Date: 11/22/2019 Status: Signed    : Hector Yoder MD (Physician)       MALE PREVENTATIVE EXAM    Assessment and Plan:       1. Routine general medical examination at a health care facility  Immunizations are reviewed and everything is up-to-date.  Living will discussed has been discussed.  Non-smoker.  He does not use alcohol.  Exercising on a regular basis.  Colonoscopy at age 52.  Prostate exam is normal and I will check a PSA for prostate cancer screening beginning at age 50.   He sees his ophthalmologist regularly and gets glaucoma screening.  Skin exam performed and recommending regular use of sunblock.  He sees a dermatologist annually.  Screening for HIV.  Will screen for diabetes with fasting glucose.  Checking fasting lipid profile.      - Comprehensive Metabolic Panel  - Urinalysis-UC if Indicated  - HM2(CBC w/o Differential)    2. Gastroesophageal reflux disease without esophagitis  Symptoms generally well controlled with Nexium.  He would rather not take a PPI.  However, he has failed stepdown therapy with worsening symptoms on H2 blockers.  - esomeprazole (NEXIUM) 40 MG capsule; Take 1 capsule (40 mg total) by mouth daily before breakfast.  Dispense: 90 capsule; Refill: 3    3. Irritable bowel syndrome, unspecified type  Chronic GI symptoms attributed to functional bowel disease.  He has seen many specialists including evaluation at [facility].  He will be going to [facility] clinic.    4. Vitamin D deficiency  Recheck vitamin D level  - Vitamin D, Total (25-Hydroxy)    5. Perianal dermatitis  Responded well to OTC Desenex 2% antifungal powder    6. Lipid screening    - Lipid [place]    7. Screening for HIV (human immunodeficiency virus)    - HIV Antigen/Antibody Screening [place]        Next follow up:  Return in 1  "year (on 11/22/2020) for Annual physical.    Immunization Review  Adult Imm Review: No immunizations due today      I discussed the following with the patient:   Adult Healthy Living: Importance of regular exercise        Subjective:   Chief Complaint: Leonela Watson is an 49 y.o. male here for a preventative health visit.     HPI: No change in chronic reflux and GI symptoms generally well controlled with Nexium    Healthy Habits  Are you taking a daily aspirin? No  Do you typically exercising at least 40 min, 3-4 times per week?  Yes  Do you usually eat at least 4 servings of fruit and vegetables a day, include whole grains and fiber and avoid regularly eating high fat foods? Yes  Have you had an eye exam in the past two years? Yes  Do you see a dentist twice per year? Yes  Do you have any concerns regarding sleep? No    Safety Screen  If you own firearms, are they secured in a locked gun cabinet or with trigger locks? The patient declines to answer  Do you feel you are safe where you are living?: Yes (11/22/2019  2:18 PM)  Do you feel you are safe in your relationship(s)?: Yes (11/22/2019  2:18 PM)      Review of Systems:  Please see above.  The rest of the review of systems are negative for all systems.     Cancer Screening     Patient has no health maintenance due at this time              History     Reviewed By Date/Time Sections Reviewed    Hector Yoder MD 11/22/2019  2:43 PM Medical, Surgical, Tobacco, Alcohol, Drug Use, Sexual Activity, Family, Social Documentation    Enriqueta Trivedi [facility] 11/22/2019  2:18 PM Tobacco, Alcohol, Drug Use, Sexual Activity            Objective:   Vital Signs:   Visit Vitals  /70 (Patient Site: Left Arm, Patient Position: Sitting, Cuff Size: Adult Large)   Pulse 78   Ht 5' 4\" (1.626 m)   Wt 157 lb (71.2 kg)   SpO2 98%   BMI 26.95 kg/m           PHYSICAL EXAM  EYES: Eyelids, conjunctiva, and sclera were normal. Pupils were normal. Cornea, iris, and lens were normal " bilaterally.  HEAD, EARS, NOSE, MOUTH, AND THROAT: Head and face were normal. Nose appearance was normal and there was no discharge. Oropharynx was normal.  NECK: Neck appearance was normal. There were no neck masses and the thyroid was not enlarged and no nodules are felt.  No lymphadenopathy.  RESPIRATORY: Breathing pattern was normal and the chest moved symmetrically.  Percussion/auscultatory percussion was normal.  Lung sounds were normal and there were no rales or wheezes.  CARDIOVASCULAR: Heart rate and rhythm were normal.  S1 and S2 were normal and there were no extra sounds or murmurs. Peripheral pulses in arms and legs were normal.  Jugular venous pressure was normal.  There was no peripheral edema.  No carotid bruits.  GASTROINTESTINAL: The abdomen was normal in contour.  Bowel sounds were present.   Palpation detected no tenderness, mass, or enlarged organs.   RECTAL/PROSTATE: No external lesions.  Sphincter tone normal.  No palpable rectal lesions.  Prostate normal size, smooth, nontender without nodules  MUSCULOSKELETAL: Skeletal configuration was normal and muscle mass was normal for age. Joint appearance was overall normal.  LYMPHATIC: There were no enlarged nodes.  SKIN/HAIR/NAILS: Skin color was normal.  Hair and nails were normal.There were no skin lesions.  NEUROLOGIC: The patient was alert and oriented to person, place, time, and circumstance. Speech was normal. Cranial nerves were normal. Motor strength was normal for age. The patient was normally coordinated.  Sensation intact.  PSYCHIATRIC:  Mood and affect were normal and the patient had normal recent and remote memory. The patient's judgment and insight were normal.         Medication List          Accurate as of November 22, 2019  5:17 PM. If you have any questions, ask your nurse or doctor.            CHANGE how you take these medications    esomeprazole 40 MG capsule  Also known as:  NexIUM  INSTRUCTIONS:  Take 1 capsule (40 mg total) by  mouth daily before breakfast.  What changed:  Another medication with the same name was removed. Continue taking this medication, and follow the directions you see here.  Changed by:  Hector Yoder MD           CONTINUE taking these medications    cholecalciferol (vitamin D3) 1,000 unit (25 mcg) tablet  INSTRUCTIONS:  Take 1,000 Units by mouth daily.        multivitamin therapeutic tablet  Also known as:  THERAGRAN  INSTRUCTIONS:  Take 1 tablet by mouth daily.           STOP taking these medications    hydrocortisone 25 mg suppository  Stopped by:  Hector Yoder MD     ranitidine 150 MG tablet  Also known as:  ZANTAC  Stopped by:  Hector Yoder MD           Where to Get Your Medications      These medications were sent to [facility] - [address], [facility] [place], [facility] [address]    Phone:  [phone]     esomeprazole 40 MG capsule         Additional Screenings Completed Today:

## 2021-06-29 NOTE — PROGRESS NOTES
Progress Notes by Hector Yoder MD at 11/13/2020  3:40 PM     Author: Hector Yoder MD Service: -- Author Type: Physician    Filed: 11/13/2020  4:30 PM Encounter Date: 11/13/2020 Status: Signed    : Hector Yoder MD (Physician)       MALE PREVENTATIVE EXAM    Assessment and Plan:       1. Routine general medical examination at a health care facility  Immunizations are reviewed and we will update his tetanus.  Living will has been discussed.  Non-smoker.  Does not use alcohol.  Regular exercise discussed.  Colonoscopy at age 52.  Prostate exam is normal and begin checking PSA annually at age 52.  Recommending annual eye exam. Skin exam performed and he sees a dermatologist every year and recommending regular use of sunblock.  Hepatitis C antibody for screening.  Will screen for diabetes with fasting glucose.  Checking fasting lipid profile.    - Comprehensive Metabolic Panel  - HM2(CBC w/o Differential)  - Urinalysis-UC if Indicated    2. Visceral hypersensitivity syndrome  Evaluated at [facility] for chronic problems with globus sensation and difficulty swallowing attributed to GERD.  However, he has had extensive evaluation including pH study and EGD and has never completely had his symptoms under control with PPI.  Dr. Sims provides diagnosis of visceral hypersensitivity syndrome.  Able to wean off of PPI using Carafate and now only on H2 blocker.  Tricyclics and SSRI discussed which potentially would provide further benefit but symptoms are manageable at this time.    3. Gastroesophageal reflux disease without esophagitis  There still may be a mild component of reflux present and he will continue famotidine 20 mg twice daily for now.  No longer needing PPI.    4. Vitamin D deficiency  Increased his dose of vitamin D last year and will recheck level after found to be low  - Vitamin D, Total (25-Hydroxy)    5. Encounter for screening for lipoid disorders    - Lipid [place]- RANDOM    6.  Encounter for hepatitis C screening test for low risk patient    - Hepatitis C Antibody (Anti-HCV)    Over 15 minutes was spent addressing these chronic  medical problems beyond time spent performing annual physical with over 50% of the time spent counseling and coordination of care discussing GERD and visceral hypersensitivity syndrome    Next follow up:  Return in 1 year (on 11/13/2021).    Immunization Review  Adult Imm Review: Updating tetanus      I discussed the following with the patient:   Adult Healthy Living: Importance of regular exercise  Healthy nutrition        Subjective:   Chief Complaint: Leonela Watson is an 50 y.o. male here for a preventative health visit.      HPI: In addition to annual preventive physical, chronic medical problems addressed at today's visit    Longstanding problem of difficulty swallowing with globus sensation attributed to GERD although diagnosis of visceral hypersensitivity syndrome provided by his gastroenterologist at [facility], Dr. Sims.  I reviewed these records.  Able to wean off of PPI with the help of Carafate and is currently managing with famotidine 20 mg twice daily.  Tricyclics and SSRI discussed as potential treatment options.  Amitriptyline had been recommended by [facility] several years ago.  Seems to be managing relatively well without additional medication at this time.  No dysphagia.  No abdominal pain.    Healthy Habits  Are you taking a daily aspirin? No  Do you typically exercising at least 40 min, 3-4 times per week?  Yes  Do you usually eat at least 4 servings of fruit and vegetables a day, include whole grains and fiber and avoid regularly eating high fat foods? Yes  Have you had an eye exam in the past two years? Yes  Do you see a dentist twice per year? Yes  Do you have any concerns regarding sleep? No    Safety Screen  If you own firearms, are they secured in a locked gun cabinet or with trigger locks? The patient declines to answer  Do you feel you  "are safe where you are living?: Yes (11/13/2020  3:42 PM)  Do you feel you are safe in your relationship(s)?: Yes (11/13/2020  3:42 PM)      Review of Systems:  Please see above.  The rest of the review of systems are negative for all systems.     Cancer Screening     Patient has no health maintenance due at this time              History     Reviewed By Date/Time Sections Reviewed    Hector Yoder MD 11/13/2020  3:47 PM Medical, Surgical, Tobacco, Alcohol, Drug Use, Sexual Activity, Family, Social Documentation    Enriqueta Trivedi CMA 11/13/2020  3:41 PM Tobacco, Alcohol, Drug Use            Objective:   Vital Signs:   Visit Vitals  /72 (Patient Site: Left Arm, Patient Position: Sitting, Cuff Size: Adult Large)   Pulse 65   Ht 5' 4\" (1.626 m)   Wt 154 lb (69.9 kg)   SpO2 99%   BMI 26.43 kg/m           PHYSICAL EXAM  EYES: Resolving hordeolum on right upper eyelid otherwise normal  HEAD, EARS, NOSE, MOUTH, AND THROAT: Head and face were normal.  TMs normal  NECK: Neck appearance was normal. There were no neck masses and the thyroid was not enlarged and no nodules are felt.  No lymphadenopathy.  RESPIRATORY: Breathing pattern was normal and the chest moved symmetrically.  Percussion/auscultatory percussion was normal.  Lung sounds were normal and there were no rales or wheezes.  CARDIOVASCULAR: Heart rate and rhythm were normal.  S1 and S2 were normal and there were no extra sounds or murmurs. Peripheral pulses in arms and legs were normal.  Jugular venous pressure was normal.  There was no peripheral edema.  No carotid bruits.  GASTROINTESTINAL: The abdomen was normal in contour.  Bowel sounds were present.   Palpation detected no tenderness, mass, or enlarged organs.   RECTAL/PROSTATE: No external lesions.  Sphincter tone normal.  No palpable rectal lesions.  Prostate normal size, smooth, nontender without nodules  MUSCULOSKELETAL: Skeletal configuration was normal and muscle mass was normal for " age. Joint appearance was overall normal.  LYMPHATIC: There were no enlarged nodes.  SKIN/HAIR/NAILS: Skin color was normal.  Hair and nails were normal.There were no skin lesions.  NEUROLOGIC: The patient was alert and oriented to person, place, time, and circumstance. Speech was normal. Cranial nerves were normal. Motor strength was normal for age. The patient was normally coordinated.  Sensation intact.  PSYCHIATRIC:  Mood and affect were normal and the patient had normal recent and remote memory. The patient's judgment and insight were normal.           Medication List          Accurate as of November 13, 2020  4:30 PM. If you have any questions, ask your nurse or doctor.            CONTINUE taking these medications    cholecalciferol (vitamin D3) 1,000 unit (25 mcg) tablet  INSTRUCTIONS: Take 1,000 Units by mouth daily.        famotidine 20 MG tablet  Also known as: PEPCID  INSTRUCTIONS: Take 20 mg by mouth 2 (two) times a day.        multivitamin therapeutic tablet  Also known as: THERAGRAN  INSTRUCTIONS: Take 1 tablet by mouth daily.           STOP taking these medications    esomeprazole 40 MG capsule  Also known as: NexIUM  Stopped by: Hector Yoder MD            Additional Screenings Completed Today:

## 2021-09-25 ENCOUNTER — HEALTH MAINTENANCE LETTER (OUTPATIENT)
Age: 51
End: 2021-09-25

## 2022-01-15 ENCOUNTER — HEALTH MAINTENANCE LETTER (OUTPATIENT)
Age: 52
End: 2022-01-15

## 2023-04-22 ENCOUNTER — HEALTH MAINTENANCE LETTER (OUTPATIENT)
Age: 53
End: 2023-04-22

## 2024-08-08 NOTE — TELEPHONE ENCOUNTER
Central PA team  [phone]  Pool: HE PA MED ([id number])          PA has been initiated.       PA form completed and faxed insurance via Cover My Meds     Key:[id number]     Medication:  hydrocortisone 25 mg suppository    Insurance:  [employer]        Response will be received via fax and may take up to 5-10 business days depending on plan        
Per [facility] this is an exception request.  The exceptions team is going to be faxing over the request form for this medication.  Once received we will fax back to [facility] for review.   
Please complete prior auth. Thanks  
Received, answered and faxed back to [facility]  
Telephone Encounter by Nargis Jo at 8/20/2019  3:15 PM     Author: Nargis Jo Service: -- Author Type: --    Filed: 8/20/2019  3:15 PM Encounter Date: 8/16/2019 Status: Signed    : Nargis Jo APPROVED:    Approval start date: 8/20/19  Approval end date:11/20/19    Pharmacy has been notified of approval and will contact patient when medication is ready for pickup.                  
Who is calling:  Patient   Reason for Call:  Patient is requesting providers to send Prior -Authorization for Hydrocortisone 25 mg suppository  Key # [id number] dated today per patient pharmacy faxed to the clinic  . Patient insurance will not cover for that medication he paid from his pocket which is very expensive . Patient cannot use cream due to Annual Pruritus .  Date of last appointment with primary care: 8/16/19  Okay to leave a detailed message: No      
[phone]